# Patient Record
Sex: FEMALE | Race: WHITE
[De-identification: names, ages, dates, MRNs, and addresses within clinical notes are randomized per-mention and may not be internally consistent; named-entity substitution may affect disease eponyms.]

---

## 2019-06-13 ENCOUNTER — HOSPITAL ENCOUNTER (INPATIENT)
Dept: HOSPITAL 26 - MED | Age: 59
LOS: 3 days | Discharge: HOME | DRG: 197 | End: 2019-06-16
Payer: COMMERCIAL

## 2019-06-13 VITALS — BODY MASS INDEX: 24.84 KG/M2 | WEIGHT: 135 LBS | HEIGHT: 62 IN

## 2019-06-13 VITALS — SYSTOLIC BLOOD PRESSURE: 115 MMHG | DIASTOLIC BLOOD PRESSURE: 71 MMHG

## 2019-06-13 DIAGNOSIS — R74.0: ICD-10-CM

## 2019-06-13 DIAGNOSIS — I82.401: Primary | ICD-10-CM

## 2019-06-13 DIAGNOSIS — E43: ICD-10-CM

## 2019-06-13 DIAGNOSIS — Z90.49: ICD-10-CM

## 2019-06-13 DIAGNOSIS — Z98.84: ICD-10-CM

## 2019-06-13 LAB
ALBUMIN FLD-MCNC: 2.4 G/DL (ref 3.4–5)
ANION GAP SERPL CALCULATED.3IONS-SCNC: 14 MMOL/L (ref 8–16)
APPEARANCE UR: (no result)
AST SERPL-CCNC: 48 U/L (ref 15–37)
BASOPHILS # BLD AUTO: 0 K/UL (ref 0–0.22)
BASOPHILS NFR BLD AUTO: 0.6 % (ref 0–2)
BILIRUB SERPL-MCNC: 0.4 MG/DL (ref 0–1)
BILIRUB UR QL STRIP: NEGATIVE
BUN SERPL-MCNC: 19 MG/DL (ref 7–18)
CHLORIDE SERPL-SCNC: 104 MMOL/L (ref 98–107)
CO2 SERPL-SCNC: 24.6 MMOL/L (ref 21–32)
COLOR UR: YELLOW
CREAT SERPL-MCNC: 1.1 MG/DL (ref 0.6–1.3)
EOSINOPHIL # BLD AUTO: 0 K/UL (ref 0–0.4)
EOSINOPHIL NFR BLD AUTO: 0.6 % (ref 0–4)
ERYTHROCYTE [DISTWIDTH] IN BLOOD BY AUTOMATED COUNT: 14.5 % (ref 11.6–13.7)
GFR SERPL CREATININE-BSD FRML MDRD: 66 ML/MIN (ref 90–?)
GLUCOSE SERPL-MCNC: 106 MG/DL (ref 74–106)
GLUCOSE UR STRIP-MCNC: NEGATIVE MG/DL
HCT VFR BLD AUTO: 35.3 % (ref 36–48)
HGB BLD-MCNC: 11.5 G/DL (ref 12–16)
HGB UR QL STRIP: NEGATIVE
LEUKOCYTE ESTERASE UR QL STRIP: (no result)
LYMPHOCYTES # BLD AUTO: 1.1 K/UL (ref 2.5–16.5)
LYMPHOCYTES NFR BLD AUTO: 16.4 % (ref 20.5–51.1)
MCH RBC QN AUTO: 27 PG (ref 27–31)
MCHC RBC AUTO-ENTMCNC: 33 G/DL (ref 33–37)
MCV RBC AUTO: 82.5 FL (ref 80–94)
MONOCYTES # BLD AUTO: 0.5 K/UL (ref 0.8–1)
MONOCYTES NFR BLD AUTO: 8 % (ref 1.7–9.3)
NEUTROPHILS # BLD AUTO: 4.9 K/UL (ref 1.8–7.7)
NEUTROPHILS NFR BLD AUTO: 74.4 % (ref 42.2–75.2)
NITRITE UR QL STRIP: NEGATIVE
PH UR STRIP: 5.5 [PH] (ref 5–9)
PLATELET # BLD AUTO: 306 K/UL (ref 140–450)
POTASSIUM SERPL-SCNC: 3.6 MMOL/L (ref 3.5–5.1)
RBC # BLD AUTO: 4.28 MIL/UL (ref 4.2–5.4)
SODIUM SERPL-SCNC: 139 MMOL/L (ref 136–145)
T4 FREE SERPL-MCNC: 0.86 NG/DL (ref 0.76–1.46)
TSH SERPL DL<=0.05 MIU/L-ACNC: 2.3 UIU/ML (ref 0.34–3.74)
WBC # BLD AUTO: 6.6 K/UL (ref 4.8–10.8)

## 2019-06-13 NOTE — NUR
PATIENT PRESENTS TO ED WITH C/O BLE EDEMA X 1 MONTH . PT STATES SHE WAS 
REFERRED BY PCP FOR ELEVATED POTASSIUM LEVELS .PATIENT DENIES ANY 
KIDNEY/CARDIAC HX, NO DIABETES. PATIETN DENIES N/V/D; SKIN IS PINK/WARM/DRY; 
AAOX4 WITH EVEN AND STEADY GAIT; LUNGS CLEAR BL; HR EVEN AND REGULAR; PT DENIES 
ANY FEVER, CP, SOB, OR COUGH AT THIS TIME; PATIENT STATES PAIN OF 0/10 AT THIS 
TIME; VSS; PATIENT POSITIONED FOR COMFORT; HOB ELEVATED; BEDRAILS UP X2; BED 
DOWN. ER MD MADE AWARE OF PT STATUS.

## 2019-06-13 NOTE — NUR
Patient will be admitted to care of DR HUFF. Admited to TELE.  Will go to 
room 121A. Belongings list completed.  Report to GRACIA BETANCUR .

## 2019-06-13 NOTE — NUR
RECEIVED BEDSIDE REPORT FROM ER RN NAM. PT A/O X4. DISCUSSED PLAN OF CARE WITH PT. ABLE TO 
MAKE NEEDS KNOWN. VERBALIZED UNDERSTANDING. STD PRECAUTIONS. NO SIGNS OF RESP DISTRESS. 
DENIES CHEST PAIN. ROOM AIR. SKIN INTACT. BLE EDEMA +2 NOTED. L AC 20 G. IV SITE PATENT AND 
INTACT. VITALS TAKEN. ARE WITHIN NORMAL LIMITS. PT ABLE TO AMBULATE. STEADY GAIT. BED IN LOW 
POSITION. CALL LIGHT WITHIN REACH. WILL CONTINUE TO MONITOR.

## 2019-06-14 VITALS — SYSTOLIC BLOOD PRESSURE: 107 MMHG | DIASTOLIC BLOOD PRESSURE: 69 MMHG

## 2019-06-14 VITALS — DIASTOLIC BLOOD PRESSURE: 62 MMHG | SYSTOLIC BLOOD PRESSURE: 109 MMHG

## 2019-06-14 VITALS — SYSTOLIC BLOOD PRESSURE: 93 MMHG | DIASTOLIC BLOOD PRESSURE: 61 MMHG

## 2019-06-14 VITALS — DIASTOLIC BLOOD PRESSURE: 66 MMHG | SYSTOLIC BLOOD PRESSURE: 117 MMHG

## 2019-06-14 VITALS — SYSTOLIC BLOOD PRESSURE: 115 MMHG | DIASTOLIC BLOOD PRESSURE: 72 MMHG

## 2019-06-14 VITALS — DIASTOLIC BLOOD PRESSURE: 70 MMHG | SYSTOLIC BLOOD PRESSURE: 108 MMHG

## 2019-06-14 NOTE — NUR
RECEIVED BEDSIDE REPORT FROM DAY SHIFT NURSE. PATIENT IS AWAKE, ALERT, AND COOPERATIVE. 
FAMILY AT BEDSIDE. RESPIRATION EVEN UNLABORED ON ROOM AIR. NO DISTRESS NOTED. SKIN IS WARM 
AND DRY. IV PATENT AND INTACT. DENIES PAIN. PATIENT IS AMBULATORY AND ABLE TO MAKE NEEDS 
KNOWN. ALL SAFETY MEASURES IN PLAN. PLAN OF CARE WAS DISCUSSED. BED IS AT LOW POSITION. CALL 
LIGHT WITHIN REACH AND VERBALIZES ITS USE. WILL CONTINUE TO MONITOR.

## 2019-06-14 NOTE — NUR
INITIAL ASSESSMENT DONE. VITALS WERE TAKEN. PATIENT CONDITION STABLE. NO DISTRESS NOTED. 
WILL CONTINUE TO MONITOR.

## 2019-06-14 NOTE — NUR
RECEIVED BEDSIDE REPORT FROM NIGHT NURSE. PT IS RESTING IN BED AOX4 WITH NO SIGNS OF 
DISTRESS BREATHING UNLABORED. PT HAS LEFT ANTECUBITAL IV SITE PATENT AND ASYMPTOMATIC SALINE 
LOCKED. ALL SAFETY MEASURES IN PLACE WITH CALL LIGHT WITHIN REACH.

## 2019-06-14 NOTE — NUR
VITALS TAKEN. TOLERATED WELL. DENIES CHEST PAIN. NO COMPLAINTS AT THIS TIME. VITALS WNL. 
WILL CONTINUE TO MONITOR.

## 2019-06-14 NOTE — NUR
PT HAS NO OBVIOUS SIGNS OF DISTRESS. PT RESTING IN BED WITH NO REQUESTS. CLARIFIED BY ASKING 
DR. AMBROSIO IF HE WANTED TO NOT DRAW LABS FOR TODAY. HE STATES ITS FINE TO NOT DRAW ANY 
LABS FOR TODAY.

## 2019-06-14 NOTE — NUR
PATIENT AWAKE IN BED. LAB DRAW AT THIS MOMENT. EDUCATED AND TOLERATED WELL. NO COMPLAINTS AT 
THIS TIME. DENIES CHEST PAIN. NO SOB. NO SIGNS OF RESP DISTRESS. WILL CONTINUE TO MONITOR.

## 2019-06-14 NOTE — NUR
PT SLEEPING IN BED EASILY AROUSABLE. EVEN CHEST RISE. NO SIGNS OF DISTRESS. NO SOB. DENIES 
PAIN. WILL CONTINUE TO MONITOR.

## 2019-06-14 NOTE — NUR
PT STANDING BY BEDSIDE ORGANIZING HER POSSESSIONS. HAS NO REQUESTS AND NO OBVIOUS SIGNS OF 
DISTRESS.

## 2019-06-14 NOTE — NUR
PATIENT SLEEPING RESPIRATION EVEN UNLABORED ON ROOM AIR. NO DISTRESS NOTED. WILL CONTINUE TO 
MONITOR.

## 2019-06-14 NOTE — NUR
BROUGHT ICE AND WATER TO PT FOR HER AND HER TWO VISITORS. PT HAS NO OTHER REQUESTS AT THIS 
TIME AND IS FREE OF OBVIOUS SIGNS OF DISTRESS.

## 2019-06-15 VITALS — DIASTOLIC BLOOD PRESSURE: 74 MMHG | SYSTOLIC BLOOD PRESSURE: 104 MMHG

## 2019-06-15 VITALS — SYSTOLIC BLOOD PRESSURE: 102 MMHG | DIASTOLIC BLOOD PRESSURE: 65 MMHG

## 2019-06-15 VITALS — DIASTOLIC BLOOD PRESSURE: 59 MMHG | SYSTOLIC BLOOD PRESSURE: 106 MMHG

## 2019-06-15 VITALS — SYSTOLIC BLOOD PRESSURE: 93 MMHG | DIASTOLIC BLOOD PRESSURE: 61 MMHG

## 2019-06-15 VITALS — SYSTOLIC BLOOD PRESSURE: 92 MMHG | DIASTOLIC BLOOD PRESSURE: 61 MMHG

## 2019-06-15 VITALS — DIASTOLIC BLOOD PRESSURE: 58 MMHG | SYSTOLIC BLOOD PRESSURE: 106 MMHG

## 2019-06-15 LAB
ALBUMIN FLD-MCNC: 2.2 G/DL (ref 3.4–5)
ANION GAP SERPL CALCULATED.3IONS-SCNC: 8.9 MMOL/L (ref 8–16)
AST SERPL-CCNC: 75 U/L (ref 15–37)
BASOPHILS # BLD AUTO: 0.1 K/UL (ref 0–0.22)
BASOPHILS NFR BLD AUTO: 0.7 % (ref 0–2)
BILIRUB SERPL-MCNC: 0.3 MG/DL (ref 0–1)
BUN SERPL-MCNC: 18 MG/DL (ref 7–18)
CHLORIDE SERPL-SCNC: 105 MMOL/L (ref 98–107)
CO2 SERPL-SCNC: 27.5 MMOL/L (ref 21–32)
CREAT SERPL-MCNC: 1 MG/DL (ref 0.6–1.3)
EOSINOPHIL # BLD AUTO: 0.1 K/UL (ref 0–0.4)
EOSINOPHIL NFR BLD AUTO: 1.4 % (ref 0–4)
ERYTHROCYTE [DISTWIDTH] IN BLOOD BY AUTOMATED COUNT: 14.3 % (ref 11.6–13.7)
GFR SERPL CREATININE-BSD FRML MDRD: 74 ML/MIN (ref 90–?)
GLUCOSE SERPL-MCNC: 128 MG/DL (ref 74–106)
HCT VFR BLD AUTO: 36.8 % (ref 36–48)
HGB BLD-MCNC: 12 G/DL (ref 12–16)
LYMPHOCYTES # BLD AUTO: 1.4 K/UL (ref 2.5–16.5)
LYMPHOCYTES NFR BLD AUTO: 17.4 % (ref 20.5–51.1)
MAGNESIUM SERPL-MCNC: 2.1 MG/DL (ref 1.8–2.4)
MCH RBC QN AUTO: 27 PG (ref 27–31)
MCHC RBC AUTO-ENTMCNC: 33 G/DL (ref 33–37)
MCV RBC AUTO: 82.5 FL (ref 80–94)
MONOCYTES # BLD AUTO: 0.6 K/UL (ref 0.8–1)
MONOCYTES NFR BLD AUTO: 7.6 % (ref 1.7–9.3)
NEUTROPHILS # BLD AUTO: 5.9 K/UL (ref 1.8–7.7)
NEUTROPHILS NFR BLD AUTO: 72.9 % (ref 42.2–75.2)
PLATELET # BLD AUTO: 286 K/UL (ref 140–450)
POTASSIUM SERPL-SCNC: 4.4 MMOL/L (ref 3.5–5.1)
RBC # BLD AUTO: 4.46 MIL/UL (ref 4.2–5.4)
SODIUM SERPL-SCNC: 137 MMOL/L (ref 136–145)
WBC # BLD AUTO: 8.1 K/UL (ref 4.8–10.8)

## 2019-06-15 RX ADMIN — APIXABAN SCH MG: 2.5 TABLET, FILM COATED ORAL at 21:03

## 2019-06-15 NOTE — NUR
RECEIVED BEDSIDE REPORT FROM DAY SHIFT NURSE. PATIENT IS AWAKE, ALERT, AND COOPERATIVE. 
RESPIRATION EVEN UNLABORED ON ROOM AIR. NO DISTRESS NOTED. SKIN IS WARM AND DRY. IV PATENT 
AND INTACT. PLAN OF CARE WAS DISCUSSED. ALL SAFETY MEASURES IN PLACE. BED IS AT LOW 
POSITION. CALL LIGHT WITHIN REACH AND VERBALIZES ITS USE. WILL CONTINUE TO MONITOR.

## 2019-06-15 NOTE — NUR
PATIENT IN BED SLEEPING RESPIRATION EVEN UNLABORED ON ROOM AIR. NO DISTRESS NOTED. WILL 
CONTINUE TO MONITOR.

## 2019-06-15 NOTE — NUR
RECEIVED LE VENOUS DOPPLER US RESULTS AT 1815 BY BRYANNA PEREZ MD ON CALL FOR DR. AMBROSIO. 
PENDING CALL BACK FOR ORDERS.

## 2019-06-15 NOTE — NUR
RECEIVED ENDORSEMENT FROM NIGHT SHIFT NURSE. PATIENT IS AAOX4, ENGLISH SPEAKING. 
RESPIRATIONS ARE EVEN AND UNLABORED ON ROOM AIR. PATIENT DENIES ANY PAIN AT THIS TIME. LEFT 
AC 20G IV INTACT AND SL. PLAN OF CARE WAS REVIEWED WITH PATIENT. PATIENT VERBALIZED 
UNDERSTANDING. SAFETY MEASURES IN PLACE, CALL LIGHT WITHIN REACH.

## 2019-06-15 NOTE — NUR
RECEIVED MD ORDERS. AWAITING US OF LE. PATIENT IS RESTING IN BED. DENIES ANY PAIN AT THIS 
TIME. NO OTHER NEEDS AT THIS TIME.

## 2019-06-15 NOTE — NUR
PATIENT RESTING IN BED. DENIES ANY PAIN AT THIS TIME. NO OTHER NEEDS AT THIS TIME. WILL 
CONTINUE TO MONITOR.

## 2019-06-15 NOTE — NUR
PATIENT RESTING IN BED. FAMILY IS PRESENT AT BEDSIDE. PATIENT DENIES PAIN AT THIS TIME. NO 
OTHER NEEDS AT THIS TIME.

## 2019-06-16 VITALS — SYSTOLIC BLOOD PRESSURE: 92 MMHG | DIASTOLIC BLOOD PRESSURE: 59 MMHG

## 2019-06-16 VITALS — DIASTOLIC BLOOD PRESSURE: 68 MMHG | SYSTOLIC BLOOD PRESSURE: 111 MMHG

## 2019-06-16 VITALS — DIASTOLIC BLOOD PRESSURE: 63 MMHG | SYSTOLIC BLOOD PRESSURE: 102 MMHG

## 2019-06-16 VITALS — SYSTOLIC BLOOD PRESSURE: 105 MMHG | DIASTOLIC BLOOD PRESSURE: 51 MMHG

## 2019-06-16 VITALS — SYSTOLIC BLOOD PRESSURE: 109 MMHG | DIASTOLIC BLOOD PRESSURE: 64 MMHG

## 2019-06-16 LAB
ALBUMIN FLD-MCNC: 1.9 G/DL (ref 3.4–5)
ANION GAP SERPL CALCULATED.3IONS-SCNC: 9.3 MMOL/L (ref 8–16)
AST SERPL-CCNC: 67 U/L (ref 15–37)
BASOPHILS # BLD AUTO: 0.1 K/UL (ref 0–0.22)
BASOPHILS NFR BLD AUTO: 1.1 % (ref 0–2)
BILIRUB SERPL-MCNC: 0.4 MG/DL (ref 0–1)
BUN SERPL-MCNC: 17 MG/DL (ref 7–18)
CHLORIDE SERPL-SCNC: 106 MMOL/L (ref 98–107)
CO2 SERPL-SCNC: 28.9 MMOL/L (ref 21–32)
CREAT SERPL-MCNC: 0.9 MG/DL (ref 0.6–1.3)
EOSINOPHIL # BLD AUTO: 0.1 K/UL (ref 0–0.4)
EOSINOPHIL NFR BLD AUTO: 1.2 % (ref 0–4)
ERYTHROCYTE [DISTWIDTH] IN BLOOD BY AUTOMATED COUNT: 14.1 % (ref 11.6–13.7)
GFR SERPL CREATININE-BSD FRML MDRD: 83 ML/MIN (ref 90–?)
GLUCOSE SERPL-MCNC: 112 MG/DL (ref 74–106)
HCT VFR BLD AUTO: 35.3 % (ref 36–48)
HGB BLD-MCNC: 11.5 G/DL (ref 12–16)
LYMPHOCYTES # BLD AUTO: 0.8 K/UL (ref 2.5–16.5)
LYMPHOCYTES NFR BLD AUTO: 11.5 % (ref 20.5–51.1)
MAGNESIUM SERPL-MCNC: 2.1 MG/DL (ref 1.8–2.4)
MCH RBC QN AUTO: 27 PG (ref 27–31)
MCHC RBC AUTO-ENTMCNC: 33 G/DL (ref 33–37)
MCV RBC AUTO: 82.1 FL (ref 80–94)
MONOCYTES # BLD AUTO: 0.5 K/UL (ref 0.8–1)
MONOCYTES NFR BLD AUTO: 6.6 % (ref 1.7–9.3)
NEUTROPHILS # BLD AUTO: 5.6 K/UL (ref 1.8–7.7)
NEUTROPHILS NFR BLD AUTO: 79.6 % (ref 42.2–75.2)
PLATELET # BLD AUTO: 258 K/UL (ref 140–450)
POTASSIUM SERPL-SCNC: 4.2 MMOL/L (ref 3.5–5.1)
RBC # BLD AUTO: 4.3 MIL/UL (ref 4.2–5.4)
SODIUM SERPL-SCNC: 140 MMOL/L (ref 136–145)
WBC # BLD AUTO: 7 K/UL (ref 4.8–10.8)

## 2019-06-16 RX ADMIN — APIXABAN SCH MG: 2.5 TABLET, FILM COATED ORAL at 09:10

## 2019-06-16 NOTE — NUR
CALLED [facility], THEY STATED THEY DO NOT HAVE PATIENTS INSURANCE ON FILE. PATIENT IS 
AWARE. SON IS TO TAKE INSURANCE TO PHARMACY. WILL FOLLOW UP.

## 2019-06-16 NOTE — NUR
PATIENTS ID BAND WAS REMOVED. ALL BELONGINGS LEFT WITH PATIENT. PATIENT AMBULATED WITH 
STEADY GAIT OFF UNIT. PATIENT IS STABLE AT THIS TIME.

## 2019-06-16 NOTE — NUR
ADMINISTERED SCHEDULED MEDICATIONS. PATIENT IS RESTING IN BED. DENIES PAIN AT THIS TIME. NO 
OTHER NEEDS AT THIS TIME.

## 2019-06-16 NOTE — NUR
RECEIVED ENDORSEMENT FROM NIGHT SHIFT NURSE. PATIENT IS AAOX4, ENGLISH SPEAKING. 
RESPIRATIONS ARE EVEN AND UNLABORED ON ROOM AIR. PATIENT DENIES ANY PAIN AT THIS TIME. LEFT 
AC IV INTACT AND SL. PLAN OF CARE WAS REVIEWED WITH PATIENT. PATIENT VERBALIZED 
UNDERSTANDING. SAFETY MEASURES IN PLACE, CALL LIGHT WITHIN REACH.

## 2019-06-16 NOTE — NUR
PER DR. MENDOZA, CALLED PATIENT'S PHARMACY TO SEE IF INSURANCE WILL COVER IT. FAXED 
PRESCRIPTION TO PHARMACY, AWAITING CALL BACK. 

-------------------------------------------------------------------------------

Addendum: 06/16/19 at 1352 by Jyothi Douglass RN

-------------------------------------------------------------------------------

PATIENT IS AWARE. STILL SITTING IN BED, NO DISTRESS NOTED. NO OTHER NEEDS AT THIS TIME.

## 2019-06-16 NOTE — NUR
DISCHARGE INSTRUCTIONS GIVEN TO PATIENT. PATIENT VERBALIZED UNDERSTANDING. ALL QUESTIONS AND 
CONCERNS ANSWERED. IV WAS DISCONTINUED, CANNULA IS INTACT WITH MINIMAL BLEEDING.

## 2019-06-17 LAB — LDH SERPL-CCNC: 225 IU/L (ref 119–226)

## 2019-06-18 LAB
A1AT SERPL-MCNC: 90 MG/DL (ref 90–200)
CERULOPLASMIN SERPL-MCNC: 15.1 MG/DL (ref 19–39)

## 2019-06-19 LAB
LDH1 CFR SERPL ELPH: 25 % (ref 17–32)
LDH2 CFR SERPL ELPH: 38 % (ref 25–40)
LDH3 CFR SERPL ELPH: 20 % (ref 17–27)
LDH4 CFR SERPL ELPH: 7 % (ref 5–13)
LDH5 CFR SERPL ELPH: 10 % (ref 4–20)

## 2019-06-20 NOTE — NUR
SW attempted to contact patient at [phone] to inform her of PCP appointment with Dr. Gustavo Coronel on Wednesday, 07/10/2019 at 9:30AM. Patient will be seen at [address] [place] [address]. Contact information to PCP is [phone]. SW left voicemail 
to patient. CHRISSIE/CM will follow up as needed.

## 2019-06-20 NOTE — NUR
SW spoke to patient regarding follow-up appointment on 07/10/2019 at 9:30AM with PCP Dr. Gustavo Coronel. Patient verbalized appreciation and understanding. SW/CM will follow up as 
needed.

## 2021-10-06 NOTE — NUR
RECEIVED CALL FROM LAB TO HAVE PT NPO AFTER MIDNIGHT FOR ULTRASOUND ABD IN THE AM. PT MADE 
AWARE OF MORNING TEST. VERBALIZED UNDERSTANDING. WILL CONTINUE TO MONITOR. shoulder pain/injury
